# Patient Record
Sex: MALE | Race: WHITE | NOT HISPANIC OR LATINO | Employment: UNEMPLOYED | URBAN - METROPOLITAN AREA
[De-identification: names, ages, dates, MRNs, and addresses within clinical notes are randomized per-mention and may not be internally consistent; named-entity substitution may affect disease eponyms.]

---

## 2017-10-31 ENCOUNTER — HOSPITAL ENCOUNTER (OUTPATIENT)
Facility: MEDICAL CENTER | Age: 37
End: 2017-10-31
Attending: EMERGENCY MEDICINE
Payer: COMMERCIAL

## 2017-10-31 ENCOUNTER — HOSPITAL ENCOUNTER (EMERGENCY)
Facility: MEDICAL CENTER | Age: 37
End: 2017-10-31
Attending: EMERGENCY MEDICINE
Payer: COMMERCIAL

## 2017-10-31 VITALS
HEART RATE: 96 BPM | DIASTOLIC BLOOD PRESSURE: 90 MMHG | RESPIRATION RATE: 18 BRPM | SYSTOLIC BLOOD PRESSURE: 150 MMHG | OXYGEN SATURATION: 98 % | TEMPERATURE: 99.2 F | WEIGHT: 156.53 LBS | BODY MASS INDEX: 21.91 KG/M2 | HEIGHT: 71 IN

## 2017-10-31 DIAGNOSIS — F33.1 MODERATE EPISODE OF RECURRENT MAJOR DEPRESSIVE DISORDER (HCC): ICD-10-CM

## 2017-10-31 LAB
AMPHETAMINES UR QL: NEGATIVE
BARBITURATES UR QL SCN: NEGATIVE
BENZODIAZ UR QL SCN: NEGATIVE
BZE UR QL SCN: NEGATIVE
PCP UR QL SCN: NEGATIVE
POC BREATHALIZER: 0 PERCENT (ref 0–0.01)
UR OPIATES 2659: NEGATIVE
UR THC 2511T: NEGATIVE
UR TRICYCLIC 2660: NEGATIVE

## 2017-10-31 PROCEDURE — 302970 POC BREATHALIZER: Performed by: EMERGENCY MEDICINE

## 2017-10-31 PROCEDURE — 302970 POC BREATHALIZER

## 2017-10-31 PROCEDURE — 80305 DRUG TEST PRSMV DIR OPT OBS: CPT

## 2017-10-31 PROCEDURE — 99284 EMERGENCY DEPT VISIT MOD MDM: CPT

## 2017-10-31 RX ORDER — NAPROXEN SODIUM 220 MG
440 TABLET ORAL PRN
COMMUNITY

## 2017-10-31 ASSESSMENT — PAIN SCALES - GENERAL: PAINLEVEL_OUTOF10: 0

## 2017-10-31 NOTE — ED NOTES
Pt now talking to Ray from [facility] via portable phone, r/t TeleMedicine computer being needed for another pt w/ a prescheduled appointment.

## 2017-10-31 NOTE — ED PROVIDER NOTES
"ED Provider Note    CHIEF COMPLAINT  Chief Complaint   Patient presents with   • Suicidal Ideation   • Depression       HPI  Flakito Robertson is a 37 y.o. male who presents complaining of depression. Patient states he's had a long-standing history of depression and anxiety. He was treated successfully in the past with an SSRI. Patient states that he recently lost a job and an apartment that he was living in. He was brought in by his aunt today for worsening depression. Patient states that he does not have a plan for suicide but he has been increasingly depressed and shut in over the past week. Patient states he does not drink or use drugs other than marijuana    REVIEW OF SYSTEMS  See HPI for further details. No fever or chills. No cough or cold symptoms. No vomiting or diarrhea. All other systems are negative.    PAST MEDICAL HISTORY  No past medical history on file.    FAMILY HISTORY  No family history on file.    SOCIAL HISTORY  Social History     Social History   • Marital status: Single     Spouse name: N/A   • Number of children: N/A   • Years of education: N/A     Social History Main Topics   • Smoking status: Not on file   • Smokeless tobacco: Not on file   • Alcohol use Not on file   • Drug use: Unknown   • Sexual activity: Not on file     Other Topics Concern   • Not on file     Social History Narrative   • No narrative on file       SURGICAL HISTORY  No past surgical history on file.    CURRENT MEDICATIONS  Home Medications     Reviewed by Olena Chappell (Pharmacy Tech) on 10/31/17 at 1223  Med List Status: Complete   Medication Last Dose Status   naproxen (ALEVE) 220 MG tablet 10/30/2017 Active                ALLERGIES  No Known Allergies    PHYSICAL EXAM  VITAL SIGNS: /90   Pulse 96   Temp 37.3 °C (99.2 °F)   Resp 18   Ht 1.803 m (5' 11\")   Wt 71 kg (156 lb 8.4 oz)   SpO2 98%   BMI 21.83 kg/m²   Constitutional: Tearful. Flat affect.  HENT: Normocephalic, Atraumatic, Bilateral " external ears normal, Oropharynx moist,  Eyes: PERRLA, EOMI, Conjunctiva normal, No discharge.   Neck: Normal range of motion, No tenderness, Supple, No stridor.   Cardiovascular: Normal heart rate, Normal rhythm, No murmurs, No rubs, No gallops.   Thorax & Lungs: Normal breath sounds, No respiratory distress, No wheezing, No chest tenderness.  Abdomen: Bowel sounds normal, Soft, No tenderness, No masses, No pulsatile masses.    Skin: Warm, Dry, No erythema, No rash.   Extremities: No edema, No tenderness, No cyanosis, No clubbing. Dorsalis pedis pulses 2+ equal bilaterally. Radial pulses 2+ equal bilaterally  Neurologic: Normal deep tendon reflexes. Strength and sensation intact. Normal gait  Psychiatric: Awake alert oriented ×3. Flat affect. Tearful at times.        RADIOLOGY/PROCEDURES  Results for orders placed or performed during the hospital encounter of 10/31/17   UR DRUG SCREEN([facility] ONLY)   Result Value Ref Range    Phencyclidine -Pcp Negative Negative    Benzodiazepines Negative Negative    Cocaine Metabolite Negative Negative    Amphetamines By Triage Negative Negative    Urine THC Negative Negative    Codeine-Morphine Negative Negative    Barbiturates Negative Negative    Tricyclic Antidepressants Negative Negative   POC BREATHALIZER   Result Value Ref Range    POC Breathalizer 0.00 0.00 - 0.01 Percent         COURSE & MEDICAL DECISION MAKING  Pertinent Labs & Imaging studies reviewed. (See chart for details)  Patient spoke to the counselor from [facility]. He was able to contract for safety. We have arranged outpatient treatment for him. Patient will be discharged home. Patient knows and understands he needs to return for any worsening symptoms or not feeling safe. He is mary for safety. He was discharged in stable condition        FINAL IMPRESSION  1. Depression  2.   3.        Electronically signed by: Bronson Helm, 10/31/2017 2:10 PM

## 2017-10-31 NOTE — ED NOTES
Patient presented for telemedicine consultation via secure and encrypted videoconferencing equipment.

## 2017-10-31 NOTE — ED NOTES
"Pt BIB aunt c/o anxiety, suicidal thoughts and depression. Pt recently lost his job and moved into an apartment on his own. Pt does not have a counselor. Pt was on paxil for 18 yrs to 30 years of age and took zoloft for few years. Pt has been his medications for 3 years. No hx of suicidal attempts. Pt did not have any attempt today. Pt has detailed plans but never acted on it.     Pulse 96   Temp 37.3 °C (99.2 °F)   Resp 18   Ht 1.803 m (5' 11\")   Wt 71 kg (156 lb 8.4 oz)   SpO2 98%   BMI 21.83 kg/m²     "

## 2017-10-31 NOTE — ED NOTES
Discussed POC w/ pt including safety precautions, discussed reason for removing personal belongings and no visitors at this time, denied questions/concerns.  Pt calm and cooperative at this time.  Urine collected and sent to lab.

## 2017-10-31 NOTE — DISCHARGE INSTRUCTIONS
Major Depressive Disorder  Major depressive disorder is a mental illness. It also may be called clinical depression or unipolar depression. Major depressive disorder usually causes feelings of sadness, hopelessness, or helplessness. Some people with this disorder do not feel particularly sad but lose interest in doing things they used to enjoy (anhedonia). Major depressive disorder also can cause physical symptoms. It can interfere with work, school, relationships, and other normal everyday activities. The disorder varies in severity but is longer lasting and more serious than the sadness we all feel from time to time in our lives.  Major depressive disorder often is triggered by stressful life events or major life changes. Examples of these triggers include divorce, loss of your job or home, a move, and the death of a family member or close friend. Sometimes this disorder occurs for no obvious reason at all. People who have family members with major depressive disorder or bipolar disorder are at higher risk for developing this disorder, with or without life stressors. Major depressive disorder can occur at any age. It may occur just once in your life (single episode major depressive disorder). It may occur multiple times (recurrent major depressive disorder).  SYMPTOMS  People with major depressive disorder have either anhedonia or depressed mood on nearly a daily basis for at least 2 weeks or longer. Symptoms of depressed mood include:  · Feelings of sadness (blue or down in the dumps) or emptiness.  · Feelings of hopelessness or helplessness.  · Tearfulness or episodes of crying (may be observed by others).  · Irritability (children and adolescents).  In addition to depressed mood or anhedonia or both, people with this disorder have at least four of the following symptoms:  · Difficulty sleeping or sleeping too much.    · Significant change (increase or decrease) in appetite or weight.    · Lack of energy or  motivation.  · Feelings of guilt and worthlessness.    · Difficulty concentrating, remembering, or making decisions.  · Unusually slow movement (psychomotor retardation) or restlessness (as observed by others).    · Recurrent wishes for death, recurrent thoughts of self-harm (suicide), or a suicide attempt.  People with major depressive disorder commonly have persistent negative thoughts about themselves, other people, and the world. People with severe major depressive disorder may experience distorted beliefs or perceptions about the world (psychotic delusions). They also may see or hear things that are not real (psychotic hallucinations).  DIAGNOSIS  Major depressive disorder is diagnosed through an assessment by your health care provider. Your health care provider will ask about aspects of your daily life, such as mood, sleep, and appetite, to see if you have the diagnostic symptoms of major depressive disorder. Your health care provider may ask about your medical history and use of alcohol or drugs, including prescription medicines. Your health care provider also may do a physical exam and blood work. This is because certain medical conditions and the use of certain substances can cause major depressive disorder-like symptoms (secondary depression). Your health care provider also may refer you to a mental health specialist for further evaluation and treatment.  TREATMENT  It is important to recognize the symptoms of major depressive disorder and seek treatment. The following treatments can be prescribed for this disorder:    · Medicine. Antidepressant medicines usually are prescribed. Antidepressant medicines are thought to correct chemical imbalances in the brain that are commonly associated with major depressive disorder. Other types of medicine may be added if the symptoms do not respond to antidepressant medicines alone or if psychotic delusions or hallucinations occur.  · Talk therapy. Talk therapy can be  helpful in treating major depressive disorder by providing support, education, and guidance. Certain types of talk therapy also can help with negative thinking (cognitive behavioral therapy) and with relationship issues that trigger this disorder (interpersonal therapy).  A mental health specialist can help determine which treatment is best for you. Most people with major depressive disorder do well with a combination of medicine and talk therapy. Treatments involving electrical stimulation of the brain can be used in situations with extremely severe symptoms or when medicine and talk therapy do not work over time. These treatments include electroconvulsive therapy, transcranial magnetic stimulation, and vagal nerve stimulation.     This information is not intended to replace advice given to you by your health care provider. Make sure you discuss any questions you have with your health care provider.     Document Released: 04/14/2014 Document Revised: 01/08/2016 Document Reviewed: 04/14/2014  [employer] Interactive Patient Education ©2016 [employer]

## 2017-10-31 NOTE — ED NOTES
Pt given discharge instructions and [facility], highlighted follow up care w/ [facility], pt verbalized understanding to information provided including follow up care and return precautions, denied questions/concerns.  Pt ambulated to [facility] w/ RN, aunt waiting to take pt home.

## 2017-10-31 NOTE — CONSULTS
[facility]   TRIAGE ASSESSMENT    Name: Flakito Robertson  MRN: [id number]  : 1980  Age: 37 y.o.  Date of assessment: 10/31/2017  PCP: Pcp Pt States None  Persons in attendance: Patient    CHIEF COMPLAINT/PRESENTING ISSUE (as stated by patient states I know I need psychiatric help but was just taking paxil till 30, I am homeless today, has history of isolation in mothers house for over 10 yrs only leaving once a month.  Mother past away 5 years ago.  Admits enabled by her.  A pattern of working for 6 months then just stops going, ):   Chief Complaint   Patient presents with   • Suicidal Ideation   • Depression        CURRENT LIVING SITUATION/SOCIAL SUPPORT: homeless but aunt willing to let him stay with her    BEHAVIORAL HEALTH TREATMENT HISTORY  Does patient/parent report a history of prior behavioral health treatment for patient?   no except for paxil for 10 years stopped 5 yrs ago    SAFETY ASSESSMENT - SELF  Does patient acknowledge current or past symptoms of dangerousness to self? yes  Does parent/significant other report patient has current or past symptoms of dangerousness to self? no  Does presenting problem suggest symptoms of dangerousness to self? no but when mom  had detailed plan but has never attempted    SAFETY ASSESSMENT - OTHERS  Does patient acknowledge current or past symptoms of aggressive behavior or risk to others? no  Does parent/significant other report patient has current or past symptoms of aggressive behavior or risk to others?  no  Does presenting problem suggest symptoms of dangerousness to others? No    Crisis Safety Plan completed and copy given to patient? yes    ABUSE/NEGLECT SCREENING  Does patient report feeling “unsafe” in his/her home, or afraid of anyone?  no  Does patient report any history of physical, sexual, or emotional abuse?  no  Does parent or significant other report any of the above? no  Is there evidence of neglect by self?  no  Is there  "evidence of neglect by a caregiver? no  Does the patient/parent report any history of CPS/APS/police involvement related to suspected abuse/neglect or domestic violence? no  Based on the information provided during the current assessment, is a mandated report of suspected abuse/neglect being made?  No    SUBSTANCE USE SCREENING  Yes:  Rosalio all substances used in the past 30 days:      Last Use Amount   [x]   Alcohol weekend 5-pack   [x]   Marijuana 3 weeks ago Daily for anxiety   []   Heroin     []   Prescription Opioids  (used without prescription, for    recreation, or in excess of prescribed amount)     []   Other Prescription  (used without prescription, for    recreation, or in excess of prescribed amount)     []   Cocaine      []   Methamphetamine     []   \"\" drugs (ectasy, MDMA)     []   Other substances        UDS results: neg  Breathalyzer results: 0.00    What consequences does the patient associate with any of the above substance use and or addictive behaviors? None    Risk factors for detox (check all that apply):  []  Seizures   []  Diaphoretic (sweating)   []  Tremors   []  Hallucinations   []  Increased blood pressure   []  Decreased blood pressure   []  Other   []  None      [] Patient education on risk factors for detoxification and instructed to return to ER as needed.        MENTAL STATUS   Participation: Active verbal participation  Grooming: Casual  Orientation: Alert  Behavior: Tense  Eye contact: Good  Mood: Depressed and Anxious  Affect: Constricted  Thought process: Logical  Thought content: Within normal limits  Speech: Rate within normal limits  Perception: Within normal limits  Memory:  No gross evidence of memory deficits  Insight: Limited  Judgment:  Limited  Other:    Collateral information: patient  Source:  [] Significant other present in person:   [] Significant other by telephone  [] [facility]   [x] [facility] Staff  [] [facility]  [] Other:     [] " Unable to complete full assessment due to:  [] Acute intoxication  [] Patient declined to participate/engage  [] Patient verbally unresponsive  [] Significant cognitive deficits  [] Significant perceptual distortions or behavioral disorganization  [] Other:      CLINICAL IMPRESSIONS:  Primary:  Mood disorder with social phobia  Secondary:  none       IDENTIFIED NEEDS/PLAN:  [Trigger DISPOSITION list for any items marked]    []  Imminent safety risk - self [] Imminent safety risk - others   []  Acute substance withdrawal []  Psychosis/Impaired reality testing   [x]  Mood/anxiety []  Substance use/Addictive behavior   [x]  Maladaptive behaviro []  Parent/child conflict   []  Family/Couples conflict []  Biomedical   [x]  Housing []  Financial   []   Legal  Occupational/Educational   []  Domestic violence []  Other:     Disposition: [facility] to [facility]    Does patient express agreement with the above plan? yes    Referral appointment(s) scheduled? Yes 11/9/17 at [id number] [place] [address]    Alert team only:   I have discussed findings and recommendations with Dr. Helm who is in agreement with these recommendations.     Referral information sent to the following community providers :        Zhen Monique R.N.  10/31/2017

## 2017-10-31 NOTE — ED NOTES
"Med rec updated and complete  Allergies reviewed  Pt states \"No prescription medications or vitamins\".  Pt states \"No antibiotics in the last 30 days\".  Pt states \"I have not gone to a pharmacy in years, not sure which one to go too\".    "

## 2017-10-31 NOTE — DISCHARGE PLANNING
[facility]  Crisis/Safety Plan    Name:  Flakito Robertson  MRN:  [id number]  Date:  10/31/2017    Warning signs that a crisis may be developing for me or I may be at risk:  1) negative thoughts-why working so harder than others  2) longer around people my anxiety builds  3) being afraid someone else will want something from you or  me    Coping strategies I can use on my own (relaxation, physical activity, etc):  1) focus on goal and power through  2) write  3) exercise daily   Get out side even if its cold.    Ways I can make my environment safe:  1) just communicate daily w aunt  2)  3)    Things I want to tell myself when I feel a crisis developin) I am smart.  Honest  2) Hard worker  3) I will get better, I can grown    People I can contact for support or distraction (and their phone numbers):  1) Aunt  2)   3)    If I’m not able to reach my support people, or the above strategies don’t help, I can contact the following professionals, agencies, or hotlines:  1) Crisis Call Center ():  [phone] -OR- [phone]  2) Crisis Text Line ():  Text START to 002902  3) [id number] [place] [address] appointment 17 200pm  [phone]  4) [facility] call 8 am [phone]    Zhen Monique R.N.

## 2022-09-01 ENCOUNTER — HOSPITAL ENCOUNTER (EMERGENCY)
Facility: MEDICAL CENTER | Age: 42
End: 2022-09-01
Attending: EMERGENCY MEDICINE
Payer: MEDICAID

## 2022-09-01 VITALS
WEIGHT: 155.65 LBS | HEIGHT: 72 IN | DIASTOLIC BLOOD PRESSURE: 92 MMHG | SYSTOLIC BLOOD PRESSURE: 149 MMHG | TEMPERATURE: 98 F | RESPIRATION RATE: 20 BRPM | HEART RATE: 106 BPM | BODY MASS INDEX: 21.08 KG/M2 | OXYGEN SATURATION: 97 %

## 2022-09-01 DIAGNOSIS — R45.851 SUICIDAL IDEATION: ICD-10-CM

## 2022-09-01 DIAGNOSIS — F33.9 EPISODE OF RECURRENT MAJOR DEPRESSIVE DISORDER, UNSPECIFIED DEPRESSION EPISODE SEVERITY (HCC): ICD-10-CM

## 2022-09-01 LAB — POC BREATHALIZER: 0 PERCENT (ref 0–0.01)

## 2022-09-01 PROCEDURE — 90791 PSYCH DIAGNOSTIC EVALUATION: CPT

## 2022-09-01 PROCEDURE — 99284 EMERGENCY DEPT VISIT MOD MDM: CPT

## 2022-09-01 PROCEDURE — 302970 POC BREATHALIZER

## 2022-09-01 ASSESSMENT — LIFESTYLE VARIABLES: DO YOU DRINK ALCOHOL: NO

## 2022-09-01 NOTE — DISCHARGE PLANNING
[facility]  Crisis/Safety Plan    Name:  Flakito Robertson  MRN:  [id number]  Date:  2022    Warning signs that a crisis may be developing for me or I may be at risk:  1) increased anxiety  2) isolation  3)    Coping strategies I can use on my own (relaxation, physical activity, etc):  1) go for a walk  2) talk to someone  3)     Ways I can make my environment safe:  1) no guns or weapons  2)  3)    Things I want to tell myself when I feel a crisis developin) I can ask for help  2) I want to make changes  3)    People I can contact for support or distraction (and their phone numbers):  1) [facility] staff  2)   3)    If I’m not able to reach my support people, or the above strategies don’t help, I can contact the following professionals, agencies, or hotlines:  1) Crisis Call Center ():  [phone] -OR- [phone]  2) Crisis Text Line ():  Text CARE TO 263964  3)  [facility] [phone]  4) [facility] [phone]    Guerita Briceno R.N.

## 2022-09-01 NOTE — CONSULTS
"[facility]   TRIAGE ASSESSMENT    Name: Flakito Robertson  MRN: [id number]  : 1980  Age: 42 y.o.  Date of assessment: 2022  PCP: Pcp Pt States None  Persons in attendance: Patient  Patient Location: [facility]    CHIEF COMPLAINT/PRESENTING ISSUE (as stated by patient): 42 year old male BIB self today today d/t chronic anxiety with SI, to walk into traffic;  voluntary pt; pt alert, oriented x 4;anxious but cooperative; pleasant; with organized thoughts and behaviors; no delusions, paranoia, hallucinations noted; insight, judgment adequate; currently denies SI, HI, or self-harm ideation; future-oriented;  states h/o chronic SI \"for twenty years\" and chronic anxiety with perseveration about taking action to decrease his anxiety and improve his quality of life; states he returned to [facility] housing this week (where he has resided in the past) after unable to afford living at [facility] ($400 a month) where he was residing for a year ; current outpt MH providers at [facility] include psychiatry with last appt 22, therapy and intensive outpatient program with Isabelle with last appt 22, next appt 22, and case management; pt states chronic issues of anxiety, depressed mood d/t \"the world is shit\"; unemployed, last worked over a year ago; states normal anxiety level rated at 5/10 and he is never at a 0/10; current psych meds x 1 week include Fluoxetine 20 mg PO daily, Buspar 5 mg PO BID, taking as prescribed; denies substance use; pt actively participating in safe DC planning and wants to return to [facility] and outpt services today    Writer RN spoke to Isabelle pt's therapist at [facility], [phone], re: current pt status and pt recommendation to f/u at [facility]        Chief Complaint   Patient presents with    Suicidal Ideation     X 10 days progressively worse, pt is currently at [facility] but states he was not able to get crisis therapy this " "morning when he had overwhelming SI, pt on prozac and buspirone X 1 week, pt states he has unreasonable plans such as suffocation or stepping in front of traffic but he doesn't want to 'leave a mess for other people to clean up', states no previous attempt. Pt is calm but upset about not being able to receive the help he wants.         CURRENT LIVING SITUATION/SOCIAL SUPPORT/FINANCIAL RESOURCES: states he returned to [employer] housing this week (where he has resided in the past) after unable to afford living at [employer] ($400 a month) where he was residing for a year; unemployed, last worked over a year ago    BEHAVIORAL HEALTH/SUBSTANCE USE TREATMENT HISTORY  Does patient/parent report a history of prior behavioral health/substance use treatment for patient?   Yes:    Dates Level of Care Facilty/Provider Diagnosis/Problem Medications   Currently, 9/2022 Outpt   [facility] psychiatry with last appt 8/29/22, therapy and intensive outpatient program with Isabelle with last appt 8/31/22, next appt 9/2/22, and case management   Fluoxetine 20 mg PO daily, Buspar 5 mg PO BID, taking as prescribed   2018 Inpt [facility]          SAFETY ASSESSMENT - SELF  Does patient acknowledge current or past symptoms of dangerousness to self or is previous history noted? Yes-earlier today, to walk into traffic, pt states chronic SI \"for twenty years\"  Does parent/significant other report patient has current or past symptoms of dangerousness to self? N\A  Does presenting problem suggest symptoms of dangerousness to self? Yes:     Past Current    Suicidal Thoughts: []  []    Suicidal Plans: []  []    Suicidal Intent: []  []    Suicide Attempts: []  []    Self-Injury []  []      For any boxes checked above, provide detail: [place]o today, to walk into traffic, pt states chronic SI \"for twenty years\"    History of suicide by family member: no  History of suicide by friend/significant other: no  Recent change in " frequency/specificity/intensity of suicidal thoughts or self-harm behavior? no  Current access to firearms, medications, or other identified means of suicide/self-harm? no  If yes, willing to restrict access to means of suicide/self-harm? yes - no guns or weapons  Protective factors present:  Fear of suicide, Future-oriented, Positive self-efficacy, Actively engaged in treatment, and Willing to address in treatment    SAFETY ASSESSMENT - OTHERS  Does patient acknowledge current or past symptoms of aggressive behavior or risk to others or is previous history noted? no  Does parent/significant other report patient has current or past symptoms of aggressive behavior or risk to others?  N\A  Does presenting problem suggest symptoms of dangerousness to others? No    LEGAL HISTORY  Does patient acknowledge history of arrest/prison/halfway or is previous history noted? no    Crisis Safety Plan completed and copy given to patient? yes    ABUSE/NEGLECT SCREENING  Does patient report feeling “unsafe” in his/her home, or afraid of anyone?  no  Does patient report any history of physical, sexual, or emotional abuse?  no  Does parent or significant other report any of the above? N\A  Is there evidence of neglect by self?  no  Is there evidence of neglect by a caregiver? no  Does the patient/parent report any history of CPS/APS/police involvement related to suspected abuse/neglect or domestic violence? no  Based on the information provided during the current assessment, is a mandated report of suspected abuse/neglect being made?  No    SUBSTANCE USE SCREENING  Pt denies substance use      MENTAL STATUS   Participation: Active verbal participation, Attentive, Engaged, and Open to feedback  Grooming: Casual and Neat  Orientation: Alert and Fully Oriented  Behavior: Calm  Eye contact: Limited  Mood: Anxious  Affect: Blunted, Flat, and Anxious  Thought process: Logical, Goal-directed, and Circumstantial  Thought content: Within normal  limits  Speech: Rate within normal limits  Perception: Within normal limits  Memory:  No gross evidence of memory deficits  Insight: Adequate  Judgment:  Adequate  Other:    Collateral information:   Source:  [] Significant other present in person:   [] Significant other by telephone  [] [facility]   [x] [facility]  [x] [facility]  [x] Other: Isabelle pt's therapist at [facility], [phone]    [] Unable to complete full assessment due to:  [] Acute intoxication  [] Patient declined to participate/engage  [] Patient verbally unresponsive  [] Significant cognitive deficits  [] Significant perceptual distortions or behavioral disorganization  [] Other:      CLINICAL IMPRESSIONS:  Primary:  anxiety r/t history  Secondary:  unemployed       IDENTIFIED NEEDS/PLAN:  [Trigger DISPOSITION list for any items marked]    []  Imminent safety risk - self [] Imminent safety risk - others   []  Acute substance withdrawal []  Psychosis/Impaired reality testing   [x]  Mood/anxiety []  Substance use/Addictive behavior   []  Maladaptive behaviro []  Parent/child conflict   []  Family/Couples conflict []  Biomedical   []  Housing [x]  Financial   []   Legal  Occupational/Educational   []  Domestic violence []  Other:     Recommended Plan of Care:  Refer to [facility], [facility] ; writer RN reviewed community  resources with  pt, with written information given, including [place] transportation included in pt's insurance plan; with pt verbal consent, writer RN send pt referral to [facility]; pt verbalized understanding; pt to DC to self today to return to [facility] housing      Has the Recommended Plan of Care/Level of Observation been reviewed with the patient's assigned nurse? yes    Does patient/parent or guardian express agreement with the above plan? yes      Referral appointment(s) scheduled? Yes-[facility] IOP (intensive outpt program) Friday,  9/2/22    Alert team only:   I have discussed findings and recommendations with Dr. Vasquez who is in agreement with these recommendations. Pt is not on a legal hold    Referral information sent to the following outpatient community providers :[facility]    Referral information sent to the following inpatient community providers :NA    If applicable : Referred  to  Alert Team for legal hold follow up at (time): olga Briceno R.N.  9/1/2022

## 2022-09-01 NOTE — ED NOTES
Pt discharged to home. Pt was given follow up instructions and resources from Alert team RN. Pt verbalized understanding of all instructions for discharge and is ambulatory out of ED with steady gait. AOx4

## 2022-09-01 NOTE — ED TRIAGE NOTES
Chief Complaint   Patient presents with    Suicidal Ideation     X 10 days progressively worse, pt is currently at [facility] but states he was not able to get crisis therapy this morning when he had overwhelming SI, pt on prozac and buspirone X 1 week, pt states he has unreasonable plans such as suffocation or stepping in front of traffic but he doesn't want to 'leave a mess for other people to clean up', states no previous attempt. Pt is calm but upset about not being able to receive the help he wants.      Pt ambulatory to triage for above complaint, VSS on RA, GCS 15, NAD.    Pt roomed immediately in [place] [address].    BP (!) 155/102   Pulse (!) 127   Temp 36.3 °C (97.4 °F) (Temporal)   Resp (!) 22   Ht 1.829 m (6')   Wt 70.6 kg (155 lb 10.3 oz)   SpO2 98%   BMI 21.11 kg/m²

## 2022-09-01 NOTE — ED PROVIDER NOTES
ED Provider Note    CHIEF COMPLAINT  Chief Complaint   Patient presents with    Suicidal Ideation     X 10 days progressively worse, pt is currently at [facility] but states he was not able to get crisis therapy this morning when he had overwhelming SI, pt on prozac and buspirone X 1 week, pt states he has unreasonable plans such as suffocation or stepping in front of traffic but he doesn't want to 'leave a mess for other people to clean up', states no previous attempt. Pt is calm but upset about not being able to receive the help he wants.        HPI  Flakito Robertson is a 42 y.o. male who presents with chief complaint of severe depression and worsening suicidal feelings.  Patient has been on and off antidepressants for the last few years, he recently reinitiated Prozac and buspirone with his care team at [facility].  This was approximately 1 week ago.  He reports that his thoughts of self-harm have worsened since that time.  He tried to call [facility] to get an appointment today however they stated they were unable to see him and advised him to come to the emergency department.  Patient reports he has a longstanding history of depression and suicidal thoughts, he was last hospitalized for these about 4 years ago.  He feels that he is getting back to that point of severity but not there yet.  He is noncommittal about a plan on how he would harm himself but reports he has multiple ideas.  He denies any associated homicidal ideation.     REVIEW OF SYSTEMS  ROS  See HPI for further details. All other systems are negative.     PAST MEDICAL HISTORY   has a past medical history of Depression.    SOCIAL HISTORY  Social History     Tobacco Use    Smoking status: Not on file    Smokeless tobacco: Not on file   Substance and Sexual Activity    Alcohol use: Not on file    Drug use: Not on file    Sexual activity: Not on file       SURGICAL HISTORY  patient denies any surgical history    CURRENT MEDICATIONS  Home Medications        Reviewed by Fabian Espitia R.N. (Registered Nurse) on 09/01/22 at 0857  Med List Status: <None>     Medication Last Dose Status   naproxen (ALEVE) 220 MG tablet  Active                    ALLERGIES  No Known Allergies    PHYSICAL EXAM  Vitals:    09/01/22 0849   BP: (!) 155/102   Pulse: (!) 127   Resp: (!) 22   Temp: 36.3 °C (97.4 °F)   SpO2: 98%       Physical Exam  Constitutional:       Appearance: He is well-developed.   HENT:      Head: Normocephalic and atraumatic.   Eyes:      Conjunctiva/sclera: Conjunctivae normal.      Pupils: Pupils are equal, round, and reactive to light.   Cardiovascular:      Rate and Rhythm: Normal rate and regular rhythm.      Heart sounds: No murmur heard.    No friction rub. No gallop.   Pulmonary:      Effort: Pulmonary effort is normal. No respiratory distress.      Breath sounds: Normal breath sounds. No wheezing.   Abdominal:      General: Bowel sounds are normal. There is no distension.      Palpations: Abdomen is soft.      Tenderness: There is no abdominal tenderness. There is no rebound.   Musculoskeletal:      Cervical back: Normal range of motion and neck supple.   Skin:     General: Skin is warm and dry.   Neurological:      Mental Status: He is alert and oriented to person, place, and time.   Psychiatric:      Comments: Depressed, tearful, endorses suicidal thoughts but is unsure if he would actually attempt, pt noncommittal on plan         COURSE & MEDICAL DECISION MAKING  Pertinent Labs & Imaging studies reviewed. (See chart for details)    Patient here with presentation consistent with severe depression and SI without overt plan.  Patient will be seen by our behavioral health team  Patient does not have any overt plan, he is also waffling between having thoughts of self-harm and severe depression, he reports that he is not actively suicidal and does not actively want to kill himself but does think about it relatively frequently.  Patient has been seen by our  alert team.  They have coordinated close follow-up care with [facility].  Patient has been offered inpatient admission for psychiatric placement but would like to attempt outpatient management.      FINAL IMPRESSION    1. Suicidal ideation    2. Episode of recurrent major depressive disorder, unspecified depression episode severity (HCC)            Electronically signed by: Bronson Vasquez M.D., 9/1/2022 9:23 AM

## 2023-09-07 ENCOUNTER — NON-PROVIDER VISIT (OUTPATIENT)
Dept: OCCUPATIONAL MEDICINE | Facility: CLINIC | Age: 43
End: 2023-09-07

## 2023-09-07 DIAGNOSIS — Z02.1 PRE-EMPLOYMENT DRUG SCREENING: ICD-10-CM

## 2023-09-07 LAB
AMP AMPHETAMINE: NORMAL
COC COCAINE: NORMAL
INT CON NEG: NORMAL
INT CON POS: NORMAL
MET METHAMPHETAMINES: NORMAL
OPI OPIATES: NORMAL
PCP PHENCYCLIDINE: NORMAL
POC DRUG COMMENT 753798-OCCUPATIONAL HEALTH: NORMAL
THC: NORMAL

## 2023-09-07 PROCEDURE — 80305 DRUG TEST PRSMV DIR OPT OBS: CPT | Performed by: NURSE PRACTITIONER
